# Patient Record
Sex: MALE | Race: WHITE | NOT HISPANIC OR LATINO | Employment: FULL TIME | URBAN - METROPOLITAN AREA
[De-identification: names, ages, dates, MRNs, and addresses within clinical notes are randomized per-mention and may not be internally consistent; named-entity substitution may affect disease eponyms.]

---

## 2021-10-28 ENCOUNTER — OFFICE VISIT (OUTPATIENT)
Dept: UROLOGY | Facility: CLINIC | Age: 61
End: 2021-10-28
Payer: COMMERCIAL

## 2021-10-28 VITALS — SYSTOLIC BLOOD PRESSURE: 157 MMHG | HEART RATE: 61 BPM | DIASTOLIC BLOOD PRESSURE: 79 MMHG

## 2021-10-28 DIAGNOSIS — R97.20 ELEVATED PSA: Primary | ICD-10-CM

## 2021-10-28 DIAGNOSIS — N40.1 BENIGN LOCALIZED PROSTATIC HYPERPLASIA WITH LOWER URINARY TRACT SYMPTOMS (LUTS): ICD-10-CM

## 2021-10-28 PROCEDURE — 99999 PR PBB SHADOW E&M-NEW PATIENT-LVL III: ICD-10-PCS | Mod: PBBFAC,,, | Performed by: UROLOGY

## 2021-10-28 PROCEDURE — 99999 PR PBB SHADOW E&M-NEW PATIENT-LVL III: CPT | Mod: PBBFAC,,, | Performed by: UROLOGY

## 2021-10-28 PROCEDURE — 99204 PR OFFICE/OUTPT VISIT, NEW, LEVL IV, 45-59 MIN: ICD-10-PCS | Mod: S$GLB,,, | Performed by: UROLOGY

## 2021-10-28 PROCEDURE — 99204 OFFICE O/P NEW MOD 45 MIN: CPT | Mod: S$GLB,,, | Performed by: UROLOGY

## 2021-10-28 RX ORDER — TAMSULOSIN HYDROCHLORIDE 0.4 MG/1
CAPSULE ORAL DAILY
COMMUNITY
End: 2022-05-20 | Stop reason: SDUPTHER

## 2021-10-28 RX ORDER — SIMVASTATIN 40 MG/1
40 TABLET, FILM COATED ORAL NIGHTLY
COMMUNITY

## 2021-12-17 ENCOUNTER — PATIENT MESSAGE (OUTPATIENT)
Dept: PHARMACY | Facility: CLINIC | Age: 61
End: 2021-12-17
Payer: COMMERCIAL

## 2022-01-09 ENCOUNTER — PATIENT MESSAGE (OUTPATIENT)
Dept: UROLOGY | Facility: CLINIC | Age: 62
End: 2022-01-09
Payer: COMMERCIAL

## 2022-01-13 ENCOUNTER — PATIENT MESSAGE (OUTPATIENT)
Dept: UROLOGY | Facility: CLINIC | Age: 62
End: 2022-01-13
Payer: COMMERCIAL

## 2022-01-14 ENCOUNTER — IMMUNIZATION (OUTPATIENT)
Dept: PHARMACY | Facility: CLINIC | Age: 62
End: 2022-01-14
Payer: COMMERCIAL

## 2022-01-14 ENCOUNTER — PATIENT MESSAGE (OUTPATIENT)
Dept: UROLOGY | Facility: CLINIC | Age: 62
End: 2022-01-14
Payer: COMMERCIAL

## 2022-01-14 DIAGNOSIS — Z23 NEED FOR VACCINATION: Primary | ICD-10-CM

## 2022-01-14 RX ORDER — SOLIFENACIN SUCCINATE 10 MG/1
10 TABLET, FILM COATED ORAL DAILY
Qty: 30 TABLET | Refills: 11 | Status: SHIPPED | OUTPATIENT
Start: 2022-01-14 | End: 2022-11-25

## 2022-04-11 ENCOUNTER — LAB VISIT (OUTPATIENT)
Dept: LAB | Facility: HOSPITAL | Age: 62
End: 2022-04-11
Attending: UROLOGY
Payer: COMMERCIAL

## 2022-04-11 DIAGNOSIS — R97.20 ELEVATED PSA: ICD-10-CM

## 2022-04-11 PROCEDURE — 84153 ASSAY OF PSA TOTAL: CPT | Performed by: UROLOGY

## 2022-04-11 PROCEDURE — 36415 COLL VENOUS BLD VENIPUNCTURE: CPT | Performed by: UROLOGY

## 2022-04-11 PROCEDURE — 86316 IMMUNOASSAY TUMOR OTHER: CPT | Performed by: UROLOGY

## 2022-04-12 ENCOUNTER — PATIENT MESSAGE (OUTPATIENT)
Dept: UROLOGY | Facility: CLINIC | Age: 62
End: 2022-04-12
Payer: COMMERCIAL

## 2022-04-12 LAB
-2 PROPSA (PHI): 13.6 PG/ML
FREE PSA (PHI): 1.3 NG/ML
PROSTATE HEALTH INDEX VALUE (PHI): 25.2
PSA FREE MFR SERPL: 22 %
PSA SERPL-MCNC: 5.8 NG/ML

## 2022-04-28 ENCOUNTER — OFFICE VISIT (OUTPATIENT)
Dept: UROLOGY | Facility: CLINIC | Age: 62
End: 2022-04-28
Payer: COMMERCIAL

## 2022-04-28 VITALS
DIASTOLIC BLOOD PRESSURE: 85 MMHG | HEIGHT: 70 IN | SYSTOLIC BLOOD PRESSURE: 141 MMHG | BODY MASS INDEX: 27.21 KG/M2 | WEIGHT: 190.06 LBS | HEART RATE: 58 BPM

## 2022-04-28 DIAGNOSIS — R97.20 ELEVATED PSA: ICD-10-CM

## 2022-04-28 PROCEDURE — 99213 OFFICE O/P EST LOW 20 MIN: CPT | Mod: S$GLB,,, | Performed by: UROLOGY

## 2022-04-28 PROCEDURE — 99999 PR PBB SHADOW E&M-EST. PATIENT-LVL III: CPT | Mod: PBBFAC,,, | Performed by: UROLOGY

## 2022-04-28 PROCEDURE — 99999 PR PBB SHADOW E&M-EST. PATIENT-LVL III: ICD-10-PCS | Mod: PBBFAC,,, | Performed by: UROLOGY

## 2022-04-28 PROCEDURE — 99213 PR OFFICE/OUTPT VISIT, EST, LEVL III, 20-29 MIN: ICD-10-PCS | Mod: S$GLB,,, | Performed by: UROLOGY

## 2022-04-28 NOTE — PROGRESS NOTES
Clinic Note  4/28/2022      Subjective:         Chief Complaint:   HPI  Jon Bowen is a 61 y.o. male with a history of elevated PSA. Recent MRI 9/30/2021 shows stable to improved PI-RADS 2 lesions. 65 ccs.  Here with wife Citlali. Friend of Messi Elizabeth.  Negative family history.  10/4/2021- 5.94  PSAD- 0.09  Biopsy 6/15/2016- benign   Biopsy (fusion) 5/11/2020- benign  Significant LUTS - nocturia, urgency, incomplete emptying, decreased FOS, frequency q 1hour. Frequency increased with acidic drinks and caffeine, managed with Vesicare and flomax by Dr. Herr.  Erectile dysfunction- Has not tired oral medications. He is satisfied with his symptoms.     (4/11/22): Free PSA 2.3, %Free PSA 22%, PHI 25.2, PSA 5.8.            No results found for: PSA, PSADIAG, PSATOTAL, PSAFREE, PSAFREEPCT    Past Medical History:   Diagnosis Date    Elevated PSA     Hypertension      Family History   Problem Relation Age of Onset    Hypertension Mother      Social History     Socioeconomic History    Marital status:    Tobacco Use    Smoking status: Never Smoker    Smokeless tobacco: Never Used   Substance and Sexual Activity    Alcohol use: Yes    Drug use: Never    Sexual activity: Yes     History reviewed. No pertinent surgical history.  Patient Active Problem List   Diagnosis    Elevated PSA     Review of Systems   Constitutional: Negative for appetite change, chills, fatigue, fever and unexpected weight change.   HENT: Negative for nosebleeds.    Respiratory: Negative for shortness of breath and wheezing.    Cardiovascular: Negative for chest pain, palpitations and leg swelling.   Gastrointestinal: Negative for abdominal distention, abdominal pain, constipation, diarrhea, nausea and vomiting.   Genitourinary: Positive for decreased urine volume, frequency, nocturia and urgency. Negative for dysuria.   Musculoskeletal: Negative for arthralgias and back pain.   Skin: Negative for pallor.  "  Neurological: Negative for dizziness, seizures and syncope.   Hematological: Negative for adenopathy.   Psychiatric/Behavioral: Negative for dysphoric mood.         Objective:      BP (!) 141/85   Pulse (!) 58   Ht 5' 10" (1.778 m)   Wt 86.2 kg (190 lb 0.6 oz)   BMI 27.27 kg/m²   Estimated body mass index is 27.27 kg/m² as calculated from the following:    Height as of this encounter: 5' 10" (1.778 m).    Weight as of this encounter: 86.2 kg (190 lb 0.6 oz).  Physical Exam  Constitutional:       General: He is not in acute distress.     Appearance: He is well-developed. He is not diaphoretic.   HENT:      Head: Atraumatic.   Neck:      Trachea: No tracheal deviation.   Cardiovascular:      Rate and Rhythm: Normal rate.   Pulmonary:      Effort: Pulmonary effort is normal. No respiratory distress.      Breath sounds: No wheezing.   Abdominal:      General: Bowel sounds are normal. There is no distension.      Palpations: Abdomen is soft. There is no mass.      Tenderness: There is no abdominal tenderness. There is no guarding or rebound.   Genitourinary:     Prostate: Enlarged.      Rectum: Normal. No mass, tenderness, external hemorrhoid or internal hemorrhoid.   Skin:     General: Skin is warm and dry.   Neurological:      Mental Status: He is alert and oriented to person, place, and time.   Psychiatric:         Behavior: Behavior normal.         Thought Content: Thought content normal.         Judgment: Judgment normal.           Assessment and Plan:           Problem List Items Addressed This Visit        Renal/    Elevated PSA          Follow up:   Will f/u with Dr. Herr for LUTS. Urodynamics.  6 months with PHI score. Discussed MRI and biopsy.    Daniel Liu          "

## 2022-05-16 ENCOUNTER — PATIENT MESSAGE (OUTPATIENT)
Dept: UROLOGY | Facility: CLINIC | Age: 62
End: 2022-05-16
Payer: COMMERCIAL

## 2022-05-20 RX ORDER — TAMSULOSIN HYDROCHLORIDE 0.4 MG/1
0.4 CAPSULE ORAL DAILY
Qty: 90 CAPSULE | Refills: 4 | Status: SHIPPED | OUTPATIENT
Start: 2022-05-20 | End: 2023-06-09

## 2022-08-07 ENCOUNTER — PATIENT MESSAGE (OUTPATIENT)
Dept: UROLOGY | Facility: CLINIC | Age: 62
End: 2022-08-07
Payer: COMMERCIAL

## 2022-08-18 ENCOUNTER — OFFICE VISIT (OUTPATIENT)
Dept: UROLOGY | Facility: CLINIC | Age: 62
End: 2022-08-18
Payer: COMMERCIAL

## 2022-08-18 VITALS
HEART RATE: 62 BPM | WEIGHT: 196 LBS | DIASTOLIC BLOOD PRESSURE: 78 MMHG | SYSTOLIC BLOOD PRESSURE: 149 MMHG | BODY MASS INDEX: 28.06 KG/M2 | HEIGHT: 70 IN

## 2022-08-18 DIAGNOSIS — N32.81 OVERACTIVE BLADDER: Primary | ICD-10-CM

## 2022-08-18 PROCEDURE — 99214 PR OFFICE/OUTPT VISIT, EST, LEVL IV, 30-39 MIN: ICD-10-PCS | Mod: S$GLB,,, | Performed by: UROLOGY

## 2022-08-18 PROCEDURE — 99999 PR PBB SHADOW E&M-EST. PATIENT-LVL III: ICD-10-PCS | Mod: PBBFAC,,, | Performed by: UROLOGY

## 2022-08-18 PROCEDURE — 99214 OFFICE O/P EST MOD 30 MIN: CPT | Mod: S$GLB,,, | Performed by: UROLOGY

## 2022-08-18 PROCEDURE — 99999 PR PBB SHADOW E&M-EST. PATIENT-LVL III: CPT | Mod: PBBFAC,,, | Performed by: UROLOGY

## 2022-08-18 RX ORDER — CIPROFLOXACIN 500 MG/1
500 TABLET ORAL 2 TIMES DAILY
COMMUNITY
Start: 2022-08-07 | End: 2022-11-25

## 2022-08-18 RX ORDER — SOD SULF/POT CHLORIDE/MAG SULF 1.479 G
12 TABLET ORAL EVERY 6 HOURS
COMMUNITY
Start: 2022-07-13

## 2022-08-18 NOTE — PROGRESS NOTES
[facility]      Overactive Bladder (OAB) Note    8/18/2022    Referred by:  No ref. provider found    HPI: Jon Bowen is a very pleasant 61 y.o. male who is an established patient in our department referred for evaluation of urinary frequency of 1-2 years duration. He is followed by Dr. Tafoya for elevated PSA; his recent note details current surveillance.  He reports bother is associated without urinary daytime frequency, with nocturia (4-6x per night) and with urgency that does not result in urinary incontinence. He reports no stress urinary incontinence associated with exertion. He does not require daily pad use.  He has not made changes to fluid intake.  He reports urinary incontinence is only at night. Patient reports urgency is independent of position.     He denies symptoms of irritative voiding including dysuria. He denies symptoms of obstructive voiding including decreased stream, hesitancy, intermittency, post void dribbling and sense of incomplete emptying. Bladder scan PVR was 296 mL. His history includes no notation of urolithiasis, hematuria, prior pelvic surgery, previous prolapse or incontinence procedures or neurological symptoms/diagnoses. He denies all other prior pelvic surgeries or neurologic diagnoses. Started on Flomax initially without improvement in symptoms.    Previous incontinence therapies:   Medication:   o oral oxybutynin ER (dry mouth was intolerable)  o VESIcare (some benefit but dry mouth and constipation)     A review of 10+ systems was conducted with pertinent positive and negative findings documented in HPI with all other systems reviewed and negative.    Past medical, family, surgical and social history reviewed as documented in chart with pertinent positive medical, family, surgical and social history detailed in HPI.    Exam Findings:    Const: no acute distress, conversant and alert  Eyes: anicteric, extraocular muscles intact  ENMT:  normocephalic, Nl oral membranes  Cardio: no cyanosis, nl cap refill  Pulm: no tachypnea; no resp distress  Musc: no laceration, no tenderness  Neuro: alert; oriented x 3  Skin: warm, dry; no petichiae  Psych: no anxiety; normal speech     Assessment/Plan:     Overactive Bladder with Urgency Incontinence (estab, worsening): His history is suggestive of Overactive Bladder (OAB) also have a suspicion for nocturnal polyuria. MRI showed a 65 cc prostate so he could also have significant SHIELDS. We will try substituting Myrbetriq for the VESIcare but given the PVR (higher today than previous), larger gland on MRI will plan on UDS. Will also review a volume-based 24 hour diary to evaluate for nocturnal polyuria.

## 2022-08-29 ENCOUNTER — TELEPHONE (OUTPATIENT)
Dept: UROLOGY | Facility: CLINIC | Age: 62
End: 2022-08-29
Payer: COMMERCIAL

## 2022-08-29 DIAGNOSIS — N32.81 OVERACTIVE BLADDER: Primary | ICD-10-CM

## 2022-10-24 ENCOUNTER — LAB VISIT (OUTPATIENT)
Dept: LAB | Facility: HOSPITAL | Age: 62
End: 2022-10-24
Attending: UROLOGY
Payer: COMMERCIAL

## 2022-10-24 DIAGNOSIS — R97.20 ELEVATED PSA: ICD-10-CM

## 2022-10-24 PROCEDURE — 84153 ASSAY OF PSA TOTAL: CPT | Performed by: UROLOGY

## 2022-10-24 PROCEDURE — 36415 COLL VENOUS BLD VENIPUNCTURE: CPT | Performed by: UROLOGY

## 2022-10-24 PROCEDURE — 84154 ASSAY OF PSA FREE: CPT | Performed by: UROLOGY

## 2022-10-25 LAB
-2 PROPSA (PHI): 14 PG/ML
FREE PSA (PHI): 1.3 NG/ML
PROSTATE HEALTH INDEX VALUE (PHI): 27.7
PSA FREE MFR SERPL: 20 %
PSA SERPL-MCNC: 6.6 NG/ML

## 2022-11-03 ENCOUNTER — OFFICE VISIT (OUTPATIENT)
Dept: UROLOGY | Facility: CLINIC | Age: 62
End: 2022-11-03
Payer: COMMERCIAL

## 2022-11-03 VITALS
BODY MASS INDEX: 28.47 KG/M2 | HEIGHT: 70 IN | SYSTOLIC BLOOD PRESSURE: 156 MMHG | HEART RATE: 71 BPM | WEIGHT: 198.88 LBS | DIASTOLIC BLOOD PRESSURE: 80 MMHG

## 2022-11-03 DIAGNOSIS — R97.20 ELEVATED PSA: Primary | ICD-10-CM

## 2022-11-03 DIAGNOSIS — N32.81 OVERACTIVE BLADDER: ICD-10-CM

## 2022-11-03 LAB
BILIRUB SERPL-MCNC: NORMAL MG/DL
BLOOD URINE, POC: NORMAL
CLARITY, POC UA: CLEAR
COLOR, POC UA: YELLOW
GLUCOSE UR QL STRIP: NORMAL
KETONES UR QL STRIP: NORMAL
LEUKOCYTE ESTERASE URINE, POC: NORMAL
NITRITE, POC UA: NORMAL
PH, POC UA: 5
PROTEIN, POC: NORMAL
SPECIFIC GRAVITY, POC UA: 1.02
UROBILINOGEN, POC UA: NORMAL

## 2022-11-03 PROCEDURE — 99214 PR OFFICE/OUTPT VISIT, EST, LEVL IV, 30-39 MIN: ICD-10-PCS | Mod: S$GLB,,,

## 2022-11-03 PROCEDURE — 99999 PR PBB SHADOW E&M-EST. PATIENT-LVL III: ICD-10-PCS | Mod: PBBFAC,,,

## 2022-11-03 PROCEDURE — 81002 POCT URINE DIPSTICK WITHOUT MICROSCOPE: ICD-10-PCS | Mod: S$GLB,,,

## 2022-11-03 PROCEDURE — 99999 PR PBB SHADOW E&M-EST. PATIENT-LVL III: CPT | Mod: PBBFAC,,,

## 2022-11-03 PROCEDURE — 99214 OFFICE O/P EST MOD 30 MIN: CPT | Mod: S$GLB,,,

## 2022-11-03 PROCEDURE — 81002 URINALYSIS NONAUTO W/O SCOPE: CPT | Mod: S$GLB,,,

## 2022-11-03 NOTE — PROGRESS NOTES
CHIEF COMPLAINT:  6 month PHI results     HISTORY OF PRESENTING ILLINESS:  Jon Bowen is a very pleasant 62 y.o. male who is an established patient in our department. Records in media tab. Recent MRI 9/30/2021 shows stable to improved PI-RADS 2 lesions. 65 ccs.  Negative family history.  10/4/2021- 5.94  PSAD- 0.09  Biopsy 6/15/2016- benign   Biopsy (fusion) 5/11/2020- benign  Significant LUTS - nocturia, urgency, incomplete emptying, decreased FOS, frequency q 1hour. Frequency increased with acidic drinks and caffeine, managed with Vesicare and flomax by Dr. Herr.  Erectile dysfunction- Has not tired oral medications. He is satisfied with his symptoms.      He is also seeing Dr. Herr for nocturia (4-6x per night) and urinary urgency that does not result in urinary incontinence. He reports no stress urinary incontinence associated with exertion. He does not require daily pad use.  He has not made changes to fluid intake.  He reports urinary incontinence is only at night. Patient reports urgency is independent of position. Urodynamics scheduled.        PHI:  (4/11/22): Free PSA 2.3, %Free PSA 22%, PHI 25.2, PSA 5.8.   (10/28/22): Free PSA 1.3, %free PSA 20%, PHI 27.7, PSA 6.6    REVIEW OF SYSTEMS:  Review of Systems   Constitutional:  Negative for chills and fever.   HENT:  Negative for congestion and sore throat.    Respiratory:  Negative for cough and shortness of breath.    Cardiovascular:  Negative for chest pain and palpitations.   Gastrointestinal:  Negative for nausea and vomiting.   Genitourinary:  Positive for frequency and urgency. Negative for dysuria, flank pain and hematuria.   Neurological:  Negative for dizziness and headaches.   All other systems reviewed and are negative.      PATIENT HISTORY:    Past Medical History:   Diagnosis Date    Elevated PSA     Hypertension        History reviewed. No pertinent surgical history.    Family History   Problem Relation Age of Onset     Hypertension Mother        Social History     Socioeconomic History    Marital status:    Tobacco Use    Smoking status: Never    Smokeless tobacco: Never   Substance and Sexual Activity    Alcohol use: Yes    Drug use: Never    Sexual activity: Yes       Allergies:  Patient has no known allergies.    Medications:    Current Outpatient Medications:     mirabegron (MYRBETRIQ) 50 mg Tb24, Take 1 tablet (50 mg total) by mouth once daily., Disp: 30 tablet, Rfl: 11    simvastatin (ZOCOR) 40 MG tablet, Take 40 mg by mouth every evening., Disp: , Rfl:     tamsulosin (FLOMAX) 0.4 mg Cap, Take 1 capsule (0.4 mg total) by mouth once daily., Disp: 90 capsule, Rfl: 4    ciprofloxacin HCl (CIPRO) 500 MG tablet, Take 500 mg by mouth 2 (two) times daily., Disp: , Rfl:     solifenacin (VESICARE) 10 MG tablet, Take 1 tablet (10 mg total) by mouth once daily., Disp: 30 tablet, Rfl: 11    SUTAB 1.479-0.188- 0.225 gram tablet, Take 12 tablets by mouth every 6 (six) hours., Disp: , Rfl:     PHYSICAL EXAMINATION:  Physical Exam  Constitutional:       Appearance: Normal appearance.   HENT:      Head: Normocephalic and atraumatic.      Right Ear: External ear normal.      Left Ear: External ear normal.   Pulmonary:      Effort: Pulmonary effort is normal. No respiratory distress.   Skin:     General: Skin is warm and dry.   Neurological:      General: No focal deficit present.      Mental Status: He is alert and oriented to person, place, and time.   Psychiatric:         Mood and Affect: Mood normal.         Behavior: Behavior normal.         LABS:  UA: yellow, sg 1.020, ph 5, trace protein      IMPRESSION:  Encounter Diagnoses   Name Primary?    Elevated PSA Yes    Overactive bladder          Assessment:       1. Elevated PSA    2. Overactive bladder        Plan:   - PSA in 1 month due to elevated PSA from most recent PHI, if 6.6 or higher - will schedule pt for MRI prostate - will call pt with results    Follow up dependent on  repeat PSA    I spent 30 minutes with the patient of which more than half was spent in direct consultation with the patient in regards to our treatment and plan.  We addressed the office findings and recent labs.   Education and recommendations of today's plan of care including home remedies and needed follow up with PCP.   We discussed the chief complaint/LUTS and the possible contributory factors.   Recommended lifestyle modifications with proper, healthy diet, good hydration if no fluid restrictions; reducing bladder irritants.   Benefits of regular exercise approved by PCP.

## 2022-11-04 ENCOUNTER — TELEPHONE (OUTPATIENT)
Dept: UROLOGY | Facility: CLINIC | Age: 62
End: 2022-11-04
Payer: COMMERCIAL

## 2022-11-04 ENCOUNTER — PATIENT MESSAGE (OUTPATIENT)
Dept: UROLOGY | Facility: CLINIC | Age: 62
End: 2022-11-04
Payer: COMMERCIAL

## 2022-11-04 NOTE — TELEPHONE ENCOUNTER
Called patient to confirm arrival time of 7am for FUDs procedure on 11/7/2022.  Gave location and explained to pt that there is no fasting for this procedure. Patient verbalized understanding.

## 2022-11-07 ENCOUNTER — HOSPITAL ENCOUNTER (OUTPATIENT)
Facility: HOSPITAL | Age: 62
Discharge: HOME | End: 2022-11-07
Attending: UROLOGY | Admitting: UROLOGY
Payer: COMMERCIAL

## 2022-11-07 VITALS
WEIGHT: 195 LBS | RESPIRATION RATE: 16 BRPM | BODY MASS INDEX: 27.98 KG/M2 | SYSTOLIC BLOOD PRESSURE: 168 MMHG | TEMPERATURE: 99 F | OXYGEN SATURATION: 98 % | HEART RATE: 68 BPM | DIASTOLIC BLOOD PRESSURE: 88 MMHG

## 2022-11-07 DIAGNOSIS — N32.81 OVERACTIVE BLADDER: ICD-10-CM

## 2022-11-07 PROCEDURE — 51728 PR COMPLEX CYSTOMETROGRAM VOIDING PRESSURE STUDIES: ICD-10-PCS | Mod: 26,,, | Performed by: UROLOGY

## 2022-11-07 PROCEDURE — 51600 PR INJECTION FOR BLADDER X-RAY: ICD-10-PCS | Mod: 51,,, | Performed by: UROLOGY

## 2022-11-07 PROCEDURE — 36000707: Performed by: UROLOGY

## 2022-11-07 PROCEDURE — 51600 INJECTION FOR BLADDER X-RAY: CPT | Mod: 51,,, | Performed by: UROLOGY

## 2022-11-07 PROCEDURE — 51741 PR UROFLOWMETRY, COMPLEX: ICD-10-PCS | Mod: 26,51,, | Performed by: UROLOGY

## 2022-11-07 PROCEDURE — 52000 CYSTOURETHROSCOPY: CPT | Mod: 59,,, | Performed by: UROLOGY

## 2022-11-07 PROCEDURE — 51797 PR VOIDING PRESS STUDY INTRA-ABDOMINAL VOID: ICD-10-PCS | Mod: 26,,, | Performed by: UROLOGY

## 2022-11-07 PROCEDURE — 51784 PR ANAL/URINARY MUSCLE STUDY: ICD-10-PCS | Mod: 26,51,, | Performed by: UROLOGY

## 2022-11-07 PROCEDURE — 36000706: Performed by: UROLOGY

## 2022-11-07 PROCEDURE — 51784 ANAL/URINARY MUSCLE STUDY: CPT | Mod: 26,51,, | Performed by: UROLOGY

## 2022-11-07 PROCEDURE — 27200971 HC CYSTO SUPPLY II (SCOPE PRCDR.): Performed by: UROLOGY

## 2022-11-07 PROCEDURE — 27200973 HC CYSTO SUPPLY IV (URODYNAMICS): Performed by: UROLOGY

## 2022-11-07 PROCEDURE — 74455 X-RAY URETHRA/BLADDER: CPT | Mod: 26,,, | Performed by: UROLOGY

## 2022-11-07 PROCEDURE — 74455 PR X-RAY URETHROCYSTOGRAM+VOIDING: ICD-10-PCS | Mod: 26,,, | Performed by: UROLOGY

## 2022-11-07 PROCEDURE — 51797 INTRAABDOMINAL PRESSURE TEST: CPT | Mod: 26,,, | Performed by: UROLOGY

## 2022-11-07 PROCEDURE — 52000 PR CYSTOURETHROSCOPY: ICD-10-PCS | Mod: 59,,, | Performed by: UROLOGY

## 2022-11-07 PROCEDURE — 51741 ELECTRO-UROFLOWMETRY FIRST: CPT | Mod: 26,51,, | Performed by: UROLOGY

## 2022-11-07 PROCEDURE — 51728 CYSTOMETROGRAM W/VP: CPT | Mod: 26,,, | Performed by: UROLOGY

## 2022-11-07 NOTE — OP NOTE
Urodynamic Report    [facility]       Referring Physician:  Otoniel Herr MD    YOB: 1960  Date of Exam: 11/7/2022    HPI: His history is suggestive of Overactive Bladder (OAB) also have a suspicion for nocturnal polyuria. MRI showed a 65 cc prostate so he could also have significant SHIELDS. We will try substituting Myrbetriq for the VESIcare but given the PVR (higher today than previous), larger gland on MRI will plan on UDS. Will also review a volume-based 24 hour diary to evaluate for nocturnal polyuria.      Cystometrogram:    Position: Sitting  Filling Rate: 30 mL/sec   Catheter: 7F  Fluid:Conray       Cath PVR prior: 50 mL Voiding Diary Capacity: Not Available   First Sensation: 25 mL First Desire: 36 mL   Strong Desire: 86 mL Cystometric Capacity: 192 mL       Pdet at group home: 0 cm H2O Compliance: Normal       Detrusor Overactivity (DO): Present Volume first DO:  67 mL   Max DO Pressure:  19 cmH2O Urgency Incontinence: Absent        Leak Point Pressure Testing:        Volume Tested: 100 mL  Abdominal Leak Point Pressure: No Leak   Stress Induced DO: Absent          Voiding Study:        Voided Volume: 142 mL PVR: 50 mL   Maximum Flow: 6 mL/sec Average Flow 3-4 mL/sec   Max Pdet: [id number]  Pdet at Max Flow: 126 cmH2O   EMG Storage: Normal Recruitment  EMG Voiding: Normal quiescence       Fluroscopic Imaging:        Bladder Contour: Mild trabeculation Vesicoureteral Reflux:No VUR   Bladder Neck at Rest: closed Bladder Neck Voiding:Narrowed - Fixed       Impression:        Sensation:Early    Capacity: Small    Compliance:Normal    Detrusor Overactivity:Present - No Leak    Continence: No Incontinence    Contractility: Bladder Outlet Obstruction    Emptying:Satisfactory    Coordination:Coordinated Voiding          Summary:  This study was performed in accordance with the current AUA/SUFU Guideline on Adult Urodynamics. On filling phase he demonstrates early first and strong  desire with a small bladder capacity. There is detrusor overactivity (DO) demonstrated on this exam (though absence of DO on urodynamic evaluation does not exclude it as causative agent of urgency symptoms). Bladder compliance at capacity is normal. On fluoroscopy, the bladder contour is smooth. There is no VUR demonstrated on this exam.     On stress testing, with adequate cough and valsalva effort, there is no ANATOLIY demonstrated and patient reports no clinical history of ANATOLIY.    On voiding phase,  voiding pressures are clearly indicative of SHIELDS (with BOOI  =114) . On VCUG obstruction appeared to be at bladder neck, confirmed on cystoscopy due to prostatic obstruction.     Cystoscopy Details: Informed consent was obtained and he was sterily prepped and 1% lidocaine jelly was injected per urethra. A flexible cystoscope was inserted into the bladder via the urethra. There was no evidence of stricture, stenosis, lesions, or diverticulum. Significant findings included bilateral lobar prostatic hypertrophy suggestive of obstruction. No prominent median lobe. Cystoscopic examination of the bladder revealed orthotopically positioned, normal bilateral ureteral orifices with clear yellow urine effluxing from each orifice. All mucosal surfaces were examined with no apparent stones, tumors, foreign bodies, erythema, trabeculation, diverticula, or ulcers. The procedure was concluded without complications. The patient was not administered a post-procedure antibiotic.     Assessment/Plan:    He clearly demonstrates bladder outlet obstruction at the level of the bladder neck and prostatic urethra, confirmed on cystoscopy. I would recommend that we treat this prior to any additional measures to treat OAB, such as SNM or Botox, though he may have both SHIELDS and OAB.     However, he has a history of elevated PSA, though two negative biopsies in the past, most recently a fusion biopsy 1-2 years ago. Most recent PSA increase from 5.8 to  6.6. His evaluation is summarized in Dr. Tafoya's note from 6 months ago.     I am recommending that he meet with urologic oncology to discuss the merits of repeat MRI or biopsy prior to to proceeding with outlet reducing procedure. Should he undergo TURP/PVP, were he to have a subsequent positive biopsy, his treatment options would be limited by his risk of incontinence. If it is felt that his likelihood of prostate cancer is low enough, I would recommend outlet-reducing procedure.

## 2022-11-18 NOTE — PROGRESS NOTES
[facility]  Urologic Oncology Clinic Note        Date of Service: 11/22/2022      Chief Complaint/Reason for Consultation: Elevated PSA    Requesting Provider:   No referring provider defined for this encounter.      History of Present Illness:   Patient 62 y.o. male presents with hx of two negative prostate biopsies and slightly rising PSA value. Here to discuss role of repeat biopsy. He was recently seen for OAB and it was determined he might be a candidate for minimally invasive BPH surgery. Before he undergoes BPH surgery there was a concern that he may need prostate biopsy. At present, he is off myrbetriq and his LUTs are stable if not improved without intervention. He is considering avoiding BPH surgery at this time.     Urologic History:     6/15/2016 -- TRUSBx -- benign  5/11/2020 -- TRUSBx -- benign  10/4/2021  -- PSA 5.94  12/6/2021 -- mpMRI -- PV 55.4, PSAD 0.09 ; GILLIAN 1 PIRADS 3 right mid transition zone  10/24/2022 -- PSA 6.6, % free PSA 20, PHI 27.7     Patient Active Problem List    Diagnosis Date Noted    Elevated PSA 04/28/2022          Review of patient's allergies indicates:  No Known Allergies     Past Medical History:   Diagnosis Date    Elevated PSA     Hypertension       Past Surgical History:   Procedure Laterality Date    CYSTOSCOPY WITH URODYNAMIC TESTING N/A 11/7/2022    Procedure: CYSTOSCOPY, WITH URODYNAMIC TESTING;  Surgeon: Otoniel Herr MD;  Location: [facility] OR [address] [place] [address];  Service: Urology;  Laterality: N/A;  1hr      Family History   Problem Relation Age of Onset    Hypertension Mother       Social History     Tobacco Use    Smoking status: Never    Smokeless tobacco: Never   Substance Use Topics    Alcohol use: Yes        Review of Systems   Constitutional:  Negative for activity change and fatigue.   HENT:  Negative for congestion.    Respiratory:  Negative for cough.    Cardiovascular:  Negative for chest pain.   Gastrointestinal:  Negative for abdominal distention and  "abdominal pain.   Genitourinary:  Negative for difficulty urinating, dysuria, frequency and hematuria.           OBJECTIVE:       Vitals:    11/22/22 1531   BP: (!) 157/84   Pulse: 63   Weight: 91.7 kg (202 lb 2.6 oz)   Height: 5' 10" (1.778 m)           General Appearance: Alert, cooperative, no distress  Head: Normocephalic  Eyes: Clear conjunctiva  Neck: No obvious LND or JVD  Lungs: Normal chest excursion, no accessory muscle use  Chest: Regular rate rhythm by palpation, no pedal edema  Extremities: Atraumatic   Lymph Nodes: No appreciable lymph adenopathy  Neurologic: No gross gait, motor or sensory deficits            LAB:      4/11/2022 -- PSA 5.8, %free PSA 22, PHI 25.2   10/24/2022 -- PSA 6.6      IMAGING:      [facility] MRI INTERPRETATION OF OUTSIDE FILMS     CLINICAL HISTORY:  Additional history: None provided.     TECHNIQUE:  Multiparametric MRI of the prostate/pelvis performed on a 3T scanner with phase pelvic coil. Multiplanar, multisequence images including high resolution, small field-of-view T2-WI; axial diffusion weighted images with multiple B-values and creation of ADC-maps; and dynamic contrast enhanced T1-weighted images through the prostate were obtained before, during, and after the administration of 10 cc intravenous gadolinium.     COMPARISON:  None.     FINDINGS:  Previous biopsy: No adenocarcinoma identified on 05/08/2022 delete     PSA: 6.6 ng/mL (date)     Prior therapy: None     Prostate: 5.2 x 3.7 x 5.7 cm corresponding to a computed volume of 55.4 cc.     Peripheral zone: Diffuse mild T2 hypointensity, score 2.     Transitional zone:     Lesion (GILLIAN) #T-1     Location: Side:Right; Region: Mid; Zone: Posterior     Greatest dimension: 1.4 cm     T2-WI: Heterogeneous signal intensity withobscured margins, score 3.     DWI/ADC: Focal (discrete and different from the background) hypointense on ADC and/or focal hyperintense on high b-value DWI; may be markedly hypointense on ADC or markedly " hyperintense on high b-value DWI, but not both, score 3.     DCE: Negative     Extraprostatic extension: Negative     PI-RADS assessment category: 3     Neurovascular bundle: Normal appearance.     Seminal vesicles: Normal appearance.     Adjacent Organ Involvement: No evidence for urinary bladder or rectal invasion.     Lymphadenopathy: None.     Other Findings: None.     Impression:     1. Focal lesion within the right mid transition zone with intermediate likelihood of neoplasm.  PI-RADS 3.  Overall Assessment: PI-RADS 3 - Intermediate (the presence of clinically significant cancer is equivocal)     Number of targets created for potential MR/US fusion biopsy     Peripheral zone: 0     Transition zone: 1     Electronically signed by resident: Justin Hicks  Date:                                            11/23/2022  Time:                                           11:06     Electronically signed by: Juan Miguel Darling MD  Date:                                            11/23/2022  Time:                                           11:40          ASSESSMENT/PLAN:     61 yo M w hx of elevated PSA, equivocal MRI, stable PHI and %free PSA.     Plan:    Today I counseled the patient that undergoing any form of BPH surgery could make incontinence worse after radical prostatectomy if he ever needed definitive treatment for prostate cancer.  However, at this time I do not believe that he needs to undergo repeat prostate biopsy as he is had 2 negative prostate biopsies in the past with stable % free PSA and PHI  scores with only mild elevation in his most recent PSA from baseline values.  I informed him that there is decent data supporting the fact that low PHI score with negative MRI portend lack of diagnostic biopsy showing clinically significant cancer and as a result at this time I believe he is at low risk for diagnosis of clinically significant prostate cancer by biopsy that would warrant treatment with surgery.   With respect to his lower urinary tract symptoms I told the patient that he may consider avoiding BPH surgery at this time since his symptoms have resolved off of medical treatment.  The patient will continue his follow-up care with Dr. Tafoya in the future for monitoring of his PSA.    The total time for the established patient visit was at least 30 minutes in both face-to-face and non-face-to-face activities, which included chart review, interpretation of results, counseling, education, ordering meds/tests/procedures, and/or coordination of care.        Waqas Armenta MD  Urologic Oncology  P: [phone]

## 2022-11-22 ENCOUNTER — OFFICE VISIT (OUTPATIENT)
Dept: UROLOGY | Facility: CLINIC | Age: 62
End: 2022-11-22
Payer: COMMERCIAL

## 2022-11-22 VITALS
HEART RATE: 63 BPM | SYSTOLIC BLOOD PRESSURE: 157 MMHG | WEIGHT: 202.19 LBS | BODY MASS INDEX: 28.95 KG/M2 | HEIGHT: 70 IN | DIASTOLIC BLOOD PRESSURE: 84 MMHG

## 2022-11-22 DIAGNOSIS — R97.20 ELEVATED PSA: Primary | ICD-10-CM

## 2022-11-22 DIAGNOSIS — N40.0 BENIGN PROSTATIC HYPERPLASIA, UNSPECIFIED WHETHER LOWER URINARY TRACT SYMPTOMS PRESENT: ICD-10-CM

## 2022-11-22 PROCEDURE — 99214 OFFICE O/P EST MOD 30 MIN: CPT | Mod: S$GLB,,, | Performed by: STUDENT IN AN ORGANIZED HEALTH CARE EDUCATION/TRAINING PROGRAM

## 2022-11-22 PROCEDURE — 99999 PR PBB SHADOW E&M-EST. PATIENT-LVL III: ICD-10-PCS | Mod: PBBFAC,,, | Performed by: STUDENT IN AN ORGANIZED HEALTH CARE EDUCATION/TRAINING PROGRAM

## 2022-11-22 PROCEDURE — 99214 PR OFFICE/OUTPT VISIT, EST, LEVL IV, 30-39 MIN: ICD-10-PCS | Mod: S$GLB,,, | Performed by: STUDENT IN AN ORGANIZED HEALTH CARE EDUCATION/TRAINING PROGRAM

## 2022-11-22 PROCEDURE — 99999 PR PBB SHADOW E&M-EST. PATIENT-LVL III: CPT | Mod: PBBFAC,,, | Performed by: STUDENT IN AN ORGANIZED HEALTH CARE EDUCATION/TRAINING PROGRAM

## 2022-11-23 ENCOUNTER — HOSPITAL ENCOUNTER (OUTPATIENT)
Dept: RADIOLOGY | Facility: HOSPITAL | Age: 62
Discharge: HOME | End: 2022-11-23
Attending: STUDENT IN AN ORGANIZED HEALTH CARE EDUCATION/TRAINING PROGRAM
Payer: COMMERCIAL

## 2022-11-23 PROCEDURE — 72197 CHG MRI, PELVIS, COMBO: ICD-10-PCS | Mod: 26,,, | Performed by: RADIOLOGY

## 2022-11-23 PROCEDURE — 72197 MRI PELVIS W/O & W/DYE: CPT | Mod: 26,,, | Performed by: RADIOLOGY

## 2023-04-10 ENCOUNTER — LAB VISIT (OUTPATIENT)
Dept: LAB | Facility: HOSPITAL | Age: 63
End: 2023-04-10
Attending: STUDENT IN AN ORGANIZED HEALTH CARE EDUCATION/TRAINING PROGRAM
Payer: COMMERCIAL

## 2023-04-10 DIAGNOSIS — R97.20 ELEVATED PSA: ICD-10-CM

## 2023-04-10 PROCEDURE — 36415 COLL VENOUS BLD VENIPUNCTURE: CPT | Performed by: STUDENT IN AN ORGANIZED HEALTH CARE EDUCATION/TRAINING PROGRAM

## 2023-04-10 PROCEDURE — 86316 IMMUNOASSAY TUMOR OTHER: CPT | Performed by: STUDENT IN AN ORGANIZED HEALTH CARE EDUCATION/TRAINING PROGRAM

## 2023-04-10 PROCEDURE — 84153 ASSAY OF PSA TOTAL: CPT | Performed by: STUDENT IN AN ORGANIZED HEALTH CARE EDUCATION/TRAINING PROGRAM

## 2023-04-11 LAB
-2 PROPSA (PHI): 20.8 PG/ML
FREE PSA (PHI): 1.5 NG/ML
PROSTATE HEALTH INDEX VALUE (PHI): 36.4
PSA FREE MFR SERPL: 22 %
PSA SERPL-MCNC: 6.9 NG/ML

## 2023-04-19 NOTE — PROGRESS NOTES
Clinic Note  4/19/2023      Subjective:         Chief Complaint:   HPI  Jon Bowen is a 62 y.o. male with a history of elevated PSA. Recent MRI 9/30/2021 shows stable to improved PI-RADS 2 lesions. 65 ccs.  Here with wife Citlali. Friend of Messi Elizabeth.  Negative family history.  10/4/2021- 5.94  PSAD- 0.09  Biopsy 6/15/2016- benign   Biopsy (fusion) 5/11/2020- benign  Significant LUTS - nocturia, urgency, incomplete emptying, decreased FOS, frequency q 1hour. Frequency increased with acidic drinks and caffeine, managed with Vesicare and flomax by Dr. Herr.  Erectile dysfunction- Has not tired oral medications. He is satisfied with his symptoms.      (4/11/22): Free PSA 2.3, %Free PSA 22%, PHI 25.2, PSA 5.8.        4/20/2023- PHI 4/10/2023- PSA 6.9, 22% free, PHI score 36.4.  Discussed data would suggest 10 to 33% risk of positive biopsy, 4% risk of high grade disease.      No results found for: PSA, PSADIAG, PSATOTAL, PSAFREE, PSAFREEPCT   Past Medical History:   Diagnosis Date    Elevated PSA     Hypertension      Family History   Problem Relation Age of Onset    Hypertension Mother      Social History     Socioeconomic History    Marital status:    Tobacco Use    Smoking status: Never    Smokeless tobacco: Never   Substance and Sexual Activity    Alcohol use: Yes    Drug use: Never    Sexual activity: Yes   Social History Narrative    ** Merged History Encounter **          Past Surgical History:   Procedure Laterality Date    CYSTOSCOPY WITH URODYNAMIC TESTING N/A 11/7/2022    Procedure: CYSTOSCOPY, WITH URODYNAMIC TESTING;  Surgeon: Otoniel Herr MD;  Location: [facility] OR [address] [place] [address];  Service: Urology;  Laterality: N/A;  1hr     Patient Active Problem List   Diagnosis    Elevated PSA     Review of Systems   Constitutional:  Negative for appetite change, chills, fatigue, fever and unexpected weight change.   HENT:  Negative for nosebleeds.    Respiratory:  Negative for shortness of  "breath and wheezing.    Cardiovascular:  Negative for chest pain, palpitations and leg swelling.   Gastrointestinal:  Negative for abdominal distention, abdominal pain, constipation, diarrhea, nausea and vomiting.   Genitourinary:  Positive for nocturia. Negative for dysuria and hematuria.   Musculoskeletal:  Negative for arthralgias and back pain.   Skin:  Negative for pallor.   Neurological:  Negative for dizziness, seizures and syncope.   Hematological:  Negative for adenopathy.   Psychiatric/Behavioral:  Negative for dysphoric mood.        Objective:      There were no vitals taken for this visit.  Estimated body mass index is 29.01 kg/m² as calculated from the following:    Height as of 11/22/22: 5' 10" (1.778 m).    Weight as of 11/22/22: 91.7 kg (202 lb 2.6 oz).  Physical Exam  Constitutional:       General: He is not in acute distress.     Appearance: He is well-developed. He is not diaphoretic.   HENT:      Head: Atraumatic.   Neck:      Trachea: No tracheal deviation.   Cardiovascular:      Rate and Rhythm: Normal rate.   Pulmonary:      Effort: Pulmonary effort is normal. No respiratory distress.      Breath sounds: No wheezing.   Abdominal:      General: Bowel sounds are normal. There is no distension.      Palpations: Abdomen is soft. There is no mass.      Tenderness: There is no abdominal tenderness. There is no guarding or rebound.   Genitourinary:     Prostate: Enlarged.      Rectum: Normal. No mass, tenderness, external hemorrhoid or internal hemorrhoid.   Skin:     General: Skin is warm and dry.   Neurological:      Mental Status: He is alert and oriented to person, place, and time.   Psychiatric:         Behavior: Behavior normal.         Thought Content: Thought content normal.         Judgment: Judgment normal.         Assessment and Plan:           Problem List Items Addressed This Visit       Elevated PSA - Primary       Follow up:   1 year with JAVIER Tafoya"

## 2023-04-20 ENCOUNTER — OFFICE VISIT (OUTPATIENT)
Dept: UROLOGY | Facility: CLINIC | Age: 63
End: 2023-04-20
Payer: COMMERCIAL

## 2023-04-20 VITALS
HEIGHT: 70 IN | WEIGHT: 206.69 LBS | SYSTOLIC BLOOD PRESSURE: 159 MMHG | BODY MASS INDEX: 29.59 KG/M2 | HEART RATE: 63 BPM | DIASTOLIC BLOOD PRESSURE: 79 MMHG

## 2023-04-20 DIAGNOSIS — R97.20 ELEVATED PSA: Primary | ICD-10-CM

## 2023-04-20 DIAGNOSIS — N40.1 BENIGN PROSTATIC HYPERPLASIA WITH NOCTURIA: ICD-10-CM

## 2023-04-20 DIAGNOSIS — R35.1 BENIGN PROSTATIC HYPERPLASIA WITH NOCTURIA: ICD-10-CM

## 2023-04-20 PROCEDURE — 99999 PR PBB SHADOW E&M-EST. PATIENT-LVL III: CPT | Mod: PBBFAC,,, | Performed by: UROLOGY

## 2023-04-20 PROCEDURE — 99999 PR PBB SHADOW E&M-EST. PATIENT-LVL III: ICD-10-PCS | Mod: PBBFAC,,, | Performed by: UROLOGY

## 2023-04-20 PROCEDURE — 99214 PR OFFICE/OUTPT VISIT, EST, LEVL IV, 30-39 MIN: ICD-10-PCS | Mod: S$GLB,,, | Performed by: UROLOGY

## 2023-04-20 PROCEDURE — 99214 OFFICE O/P EST MOD 30 MIN: CPT | Mod: S$GLB,,, | Performed by: UROLOGY

## 2023-06-09 RX ORDER — TAMSULOSIN HYDROCHLORIDE 0.4 MG/1
CAPSULE ORAL
Qty: 90 CAPSULE | Refills: 4 | Status: SHIPPED | OUTPATIENT
Start: 2023-06-09

## 2023-10-27 ENCOUNTER — IMMUNIZATION (OUTPATIENT)
Dept: INTERNAL MEDICINE | Facility: CLINIC | Age: 63
End: 2023-10-27
Payer: COMMERCIAL

## 2023-10-27 PROCEDURE — 90471 FLU VACCINE (QUAD) GREATER THAN OR EQUAL TO 3YO PRESERVATIVE FREE IM: ICD-10-PCS | Mod: S$GLB,,, | Performed by: INTERNAL MEDICINE

## 2023-10-27 PROCEDURE — 90686 FLU VACCINE (QUAD) GREATER THAN OR EQUAL TO 3YO PRESERVATIVE FREE IM: ICD-10-PCS | Mod: S$GLB,,, | Performed by: INTERNAL MEDICINE

## 2023-10-27 PROCEDURE — 90471 IMMUNIZATION ADMIN: CPT | Mod: S$GLB,,, | Performed by: INTERNAL MEDICINE

## 2023-10-27 PROCEDURE — 90686 IIV4 VACC NO PRSV 0.5 ML IM: CPT | Mod: S$GLB,,, | Performed by: INTERNAL MEDICINE

## 2024-04-15 ENCOUNTER — LAB VISIT (OUTPATIENT)
Dept: LAB | Facility: HOSPITAL | Age: 64
End: 2024-04-15
Attending: UROLOGY
Payer: COMMERCIAL

## 2024-04-15 DIAGNOSIS — R97.20 ELEVATED PSA: ICD-10-CM

## 2024-04-15 PROCEDURE — 86316 IMMUNOASSAY TUMOR OTHER: CPT | Performed by: UROLOGY

## 2024-04-15 PROCEDURE — 84153 ASSAY OF PSA TOTAL: CPT | Performed by: UROLOGY

## 2024-04-15 PROCEDURE — 36415 COLL VENOUS BLD VENIPUNCTURE: CPT | Performed by: UROLOGY

## 2024-04-17 LAB
-2 PROPSA (PHI): 15.7 PG/ML
FREE PSA (PHI): 1.5 NG/ML
PROSTATE HEALTH INDEX VALUE (PHI): 27.7
PSA FREE MFR SERPL: 21 %
PSA SERPL-MCNC: 7 NG/ML

## 2024-04-17 NOTE — PROGRESS NOTES
"Clinic Note  4/17/2024      Subjective:         Chief Complaint:   HPI  Jon Bowen is a 63 y.o. male with a history of elevated PSA. Recent MRI 9/30/2021 shows stable to improved PI-RADS 2 lesions. 66 ccs.  Here with wife Citlali. Friend of Messi Elizabeth.  Negative family history.  10/4/2021- 5.94  PSAD- 0.09  Biopsy 6/15/2016- benign   Biopsy (fusion) 5/11/2020- benign  Significant LUTS - nocturia, urgency, incomplete emptying, decreased FOS, frequency q 1hour. Frequency increased with acidic drinks and caffeine, managed with Vesicare and flomax by Dr. Herr.  Erectile dysfunction- Has not tired oral medications. He is satisfied with his symptoms.      (4/11/22): Free PSA 2.3, %Free PSA 22%, PHI 25.2, PSA 5.8.        4/20/2023- PHI 4/10/2023- PSA 6.9, 22% free, PHI score 36.4.  Discussed data would suggest 10 to 33% risk of positive biopsy, 4% risk of high grade disease.     4/18/2024- PHI- PSA 7.0, 21% free, PHI score-27.7.  On Flomax for LUTS.  PCPT-      Is voiding well on flomax. Some complaints of fatigue, ED, and low libido. States he has low testosterone checked by his prior urologist somewhere in the 200s. Will recheck with a morning T. Is not interested in a PDE5i right now.  No results found for: "PSA", "PSADIAG", "PSATOTAL", "PSAFREE", "PSAFREEPCT"   Past Medical History:   Diagnosis Date    Elevated PSA     Hypertension      Family History   Problem Relation Name Age of Onset    Hypertension Mother       Social History     Socioeconomic History    Marital status:    Tobacco Use    Smoking status: Never    Smokeless tobacco: Never   Substance and Sexual Activity    Alcohol use: Yes    Drug use: Never    Sexual activity: Yes   Social History Narrative    ** Merged History Encounter **          Past Surgical History:   Procedure Laterality Date    CYSTOSCOPY WITH URODYNAMIC TESTING N/A 11/7/2022    Procedure: CYSTOSCOPY, WITH URODYNAMIC TESTING;  Surgeon: Otoniel Herr MD;  " "[facility];  Service: Urology;  Laterality: N/A;  1hr     Patient Active Problem List   Diagnosis    Elevated PSA    Benign prostatic hyperplasia with nocturia     Review of Systems   Constitutional:  Positive for fatigue and unexpected weight change. Negative for activity change, appetite change, diaphoresis and fever.   HENT: Negative.     Eyes: Negative.    Respiratory:  Negative for cough, shortness of breath and wheezing.    Cardiovascular:  Negative for chest pain, palpitations and leg swelling.   Gastrointestinal:  Negative for abdominal pain, blood in stool, constipation, diarrhea, nausea and vomiting.   Genitourinary:  Negative for nocturia.   Musculoskeletal:  Negative for back pain.   Skin:  Negative for color change, rash and wound.   Neurological:  Negative for dizziness, weakness, numbness and headaches.   Hematological:  Does not bruise/bleed easily.   Psychiatric/Behavioral: Negative.           Objective:      There were no vitals taken for this visit.  Estimated body mass index is 29.66 kg/m² as calculated from the following:    Height as of 4/20/23: 5' 10" (1.778 m).    Weight as of 4/20/23: 93.8 kg (206 lb 10.9 oz).  Physical Exam  Vitals and nursing note reviewed.   Constitutional:       General: He is not in acute distress.     Appearance: He is well-developed.   Cardiovascular:      Rate and Rhythm: Normal rate.   Pulmonary:      Effort: Pulmonary effort is normal. No respiratory distress.   Abdominal:      General: There is no distension.      Palpations: Abdomen is soft.      Tenderness: There is no abdominal tenderness.   Musculoskeletal:         General: No tenderness. Normal range of motion.   Skin:     General: Skin is warm and dry.      Findings: No rash.   Neurological:      Mental Status: He is alert and oriented to person, place, and time.   Psychiatric:         Judgment: Judgment normal.           Assessment and Plan:           Problem List Items Addressed This Visit  "      Elevated PSA - Primary    Benign prostatic hyperplasia with nocturia       Follow up:   Will check T and have him see JUDE for this  Phi in 1 year    Juan Miguel Tafoya

## 2024-04-18 ENCOUNTER — OFFICE VISIT (OUTPATIENT)
Dept: UROLOGY | Facility: CLINIC | Age: 64
End: 2024-04-18
Payer: COMMERCIAL

## 2024-04-18 VITALS
DIASTOLIC BLOOD PRESSURE: 79 MMHG | SYSTOLIC BLOOD PRESSURE: 171 MMHG | WEIGHT: 204.81 LBS | HEIGHT: 70 IN | BODY MASS INDEX: 29.32 KG/M2 | HEART RATE: 66 BPM

## 2024-04-18 DIAGNOSIS — R97.20 ELEVATED PSA: Primary | ICD-10-CM

## 2024-04-18 DIAGNOSIS — N40.1 BENIGN PROSTATIC HYPERPLASIA WITH NOCTURIA: ICD-10-CM

## 2024-04-18 DIAGNOSIS — N52.01 ERECTILE DYSFUNCTION DUE TO ARTERIAL INSUFFICIENCY: ICD-10-CM

## 2024-04-18 DIAGNOSIS — R35.1 BENIGN PROSTATIC HYPERPLASIA WITH NOCTURIA: ICD-10-CM

## 2024-04-18 PROCEDURE — 99999 PR PBB SHADOW E&M-EST. PATIENT-LVL III: CPT | Mod: PBBFAC,,, | Performed by: UROLOGY

## 2024-04-18 PROCEDURE — 99214 OFFICE O/P EST MOD 30 MIN: CPT | Mod: S$GLB,,, | Performed by: UROLOGY

## 2024-04-24 ENCOUNTER — LAB VISIT (OUTPATIENT)
Dept: LAB | Facility: HOSPITAL | Age: 64
End: 2024-04-24
Payer: COMMERCIAL

## 2024-04-24 DIAGNOSIS — N52.01 ERECTILE DYSFUNCTION DUE TO ARTERIAL INSUFFICIENCY: ICD-10-CM

## 2024-04-24 LAB — TESTOST SERPL-MCNC: 400 NG/DL (ref 304–1227)

## 2024-04-24 PROCEDURE — 84403 ASSAY OF TOTAL TESTOSTERONE: CPT | Performed by: STUDENT IN AN ORGANIZED HEALTH CARE EDUCATION/TRAINING PROGRAM

## 2024-04-24 PROCEDURE — 36415 COLL VENOUS BLD VENIPUNCTURE: CPT | Performed by: STUDENT IN AN ORGANIZED HEALTH CARE EDUCATION/TRAINING PROGRAM

## 2024-09-09 ENCOUNTER — TELEPHONE (OUTPATIENT)
Dept: UROLOGY | Facility: CLINIC | Age: 64
End: 2024-09-09
Payer: COMMERCIAL

## 2024-09-09 RX ORDER — TAMSULOSIN HYDROCHLORIDE 0.4 MG/1
0.4 CAPSULE ORAL DAILY
Qty: 90 CAPSULE | Refills: 4 | Status: SHIPPED | OUTPATIENT
Start: 2024-09-09

## 2024-09-09 NOTE — TELEPHONE ENCOUNTER
Called Mr. Bowen and spoke with him, he stated he knew he needed to come in for a follow up but got busy.  He left his planner and asked to call in the morning to set up the visit.  I said at your convenience, he stated he appreciated the medication being called into the pharmacy for him as it allowed time for him to come in for a follow up without having to miss any days taking the flomax.

## 2024-10-09 ENCOUNTER — IMMUNIZATION (OUTPATIENT)
Dept: INTERNAL MEDICINE | Facility: CLINIC | Age: 64
End: 2024-10-09
Payer: COMMERCIAL

## 2024-10-09 DIAGNOSIS — Z23 NEED FOR VACCINATION: Primary | ICD-10-CM

## 2024-10-09 PROCEDURE — 90656 IIV3 VACC NO PRSV 0.5 ML IM: CPT | Mod: S$GLB,,, | Performed by: INTERNAL MEDICINE

## 2024-10-09 PROCEDURE — 90471 IMMUNIZATION ADMIN: CPT | Mod: S$GLB,,, | Performed by: INTERNAL MEDICINE

## 2025-04-16 ENCOUNTER — LAB VISIT (OUTPATIENT)
Dept: LAB | Facility: HOSPITAL | Age: 65
End: 2025-04-16
Attending: UROLOGY
Payer: COMMERCIAL

## 2025-04-16 DIAGNOSIS — R97.20 ELEVATED PSA: ICD-10-CM

## 2025-04-16 PROCEDURE — 84154 ASSAY OF PSA FREE: CPT

## 2025-04-16 PROCEDURE — 84153 ASSAY OF PSA TOTAL: CPT

## 2025-04-16 PROCEDURE — 36415 COLL VENOUS BLD VENIPUNCTURE: CPT

## 2025-04-16 PROCEDURE — 86316 IMMUNOASSAY TUMOR OTHER: CPT

## 2025-04-16 NOTE — PROGRESS NOTES
"Clinic Note  4/16/2025      Subjective:         Chief Complaint:   HPI  Jon Bowen is a 64 y.o. male with a history of elevated PSA. Recent MRI 9/30/2021 shows stable to improved PI-RADS 2 lesions. 65 ccs.  Here with wife Citlali. Friend of Messi Elizabeth.  Negative family history.  10/4/2021- 5.94  PSAD- 0.09  Biopsy 6/15/2016- benign   Biopsy (fusion) 5/11/2020- benign  Significant LUTS - nocturia, urgency, incomplete emptying, decreased FOS, frequency q 1hour. Frequency increased with acidic drinks and caffeine, managed with Vesicare and flomax by Dr. Herr.  Erectile dysfunction- Has not tired oral medications. He is satisfied with his symptoms.      (4/11/22): Free PSA 2.3, %Free PSA 22%, PHI 25.2, PSA 5.8.        4/20/2023- PHI 4/10/2023- PSA 6.9, 22% free, PHI score 36.4.  Discussed data would suggest 10 to 33% risk of positive biopsy, 4% risk of high grade disease.     4/18/2024- PHI- PSA 7.0, 21% free, PHI score-27.7.  On Flomax for LUTS.  PCPT-       Is voiding well on flomax. Some complaints of fatigue, ED, and low libido. States he has low testosterone checked by his prior urologist somewhere in the 200s. Will recheck with a morning T. Is not interested in a PDE5i right now.    4/17/2025- PHI pending. Pleased with how he voids on flomax.       No results found for: "PSA", "PSADIAG", "PSATOTAL", "PSAFREE", "PSAFREEPCT"   Past Medical History:   Diagnosis Date    Elevated PSA     Hypertension      Family History   Problem Relation Name Age of Onset    Hypertension Mother       Social History[1]  Past Surgical History:   Procedure Laterality Date    CYSTOSCOPY WITH URODYNAMIC TESTING N/A 11/7/2022    Procedure: CYSTOSCOPY, WITH URODYNAMIC TESTING;  Surgeon: Otoniel Herr MD;  Location: [facility] OR [address] [place] [address];  Service: Urology;  Laterality: N/A;  1hr     Problem List[2]  Review of Systems   Constitutional:  Negative for chills, fever and unexpected weight change.   HENT:  Negative for " "congestion and sore throat.    Respiratory:  Negative for cough and shortness of breath.    Cardiovascular:  Negative for chest pain and leg swelling.   Gastrointestinal:  Negative for abdominal pain, nausea and vomiting.   Genitourinary:  Negative for difficulty urinating, dysuria and hematuria.   Neurological:  Negative for weakness.   Psychiatric/Behavioral:  Negative for agitation and confusion.          Objective:      There were no vitals taken for this visit.  Estimated body mass index is 29.39 kg/m² as calculated from the following:    Height as of 4/18/24: 5' 10" (1.778 m).    Weight as of 4/18/24: 92.9 kg (204 lb 12.9 oz).  Physical Exam  Constitutional:       General: He is not in acute distress.  HENT:      Head: Normocephalic.   Cardiovascular:      Rate and Rhythm: Normal rate.   Pulmonary:      Effort: Pulmonary effort is normal.   Abdominal:      General: There is no distension.      Palpations: Abdomen is soft.      Tenderness: There is no abdominal tenderness.   Genitourinary:     Comments: [place] - prostate feels enlarged. No nodules or indurations  Musculoskeletal:         General: Normal range of motion.   Skin:     General: Skin is warm and dry.   Neurological:      Mental Status: He is alert.      Coordination: Coordination normal.   Psychiatric:         Behavior: Behavior normal.           Assessment and Plan:   1 year with PHI.        Problem List Items Addressed This Visit       Elevated PSA - Primary     PHI in process. Will call with results    Follow up:       Juan Miguel Tafoya               [1]   Social History  Socioeconomic History    Marital status:    Tobacco Use    Smoking status: Never    Smokeless tobacco: Never   Substance and Sexual Activity    Alcohol use: Yes    Drug use: Never    Sexual activity: Yes   Social History Narrative    ** Merged History Encounter **          Social Drivers of Health     Financial Resource Strain: Low Risk  (4/14/2025)    Overall Financial " Resource Strain (CARDIA)     Difficulty of Paying Living Expenses: Not hard at all   Food Insecurity: No Food Insecurity (4/14/2025)    Hunger Vital Sign     Worried About Running Out of Food in the Last Year: Never true     Ran Out of Food in the Last Year: Never true   Transportation Needs: No Transportation Needs (4/14/2025)    PRAPARE - Transportation     Lack of Transportation (Medical): No     Lack of Transportation (Non-Medical): No   Physical Activity: Insufficiently Active (4/14/2025)    Exercise Vital Sign     Days of Exercise per Week: 3 days     Minutes of Exercise per Session: 30 min   Stress: No Stress Concern Present (4/14/2025)    [employer] - Occupational Stress Questionnaire     Feeling of Stress : Only a little   Housing Stability: Low Risk  (4/14/2025)    Housing Stability Vital Sign     Unable to Pay for Housing in the Last Year: No     Number of Times Moved in the Last Year: 0     Homeless in the Last Year: No   [2]   Patient Active Problem List  Diagnosis    Elevated PSA    Benign prostatic hyperplasia with nocturia

## 2025-04-17 ENCOUNTER — OFFICE VISIT (OUTPATIENT)
Dept: UROLOGY | Facility: CLINIC | Age: 65
End: 2025-04-17
Payer: COMMERCIAL

## 2025-04-17 VITALS
DIASTOLIC BLOOD PRESSURE: 76 MMHG | TEMPERATURE: 98 F | BODY MASS INDEX: 30.32 KG/M2 | SYSTOLIC BLOOD PRESSURE: 147 MMHG | WEIGHT: 211.31 LBS | HEART RATE: 105 BPM

## 2025-04-17 DIAGNOSIS — R97.20 ELEVATED PSA: Primary | ICD-10-CM

## 2025-04-17 PROCEDURE — 99214 OFFICE O/P EST MOD 30 MIN: CPT | Mod: S$GLB,,, | Performed by: UROLOGY

## 2025-04-17 PROCEDURE — G2211 COMPLEX E/M VISIT ADD ON: HCPCS | Mod: S$GLB,,, | Performed by: UROLOGY

## 2025-04-17 PROCEDURE — 99999 PR PBB SHADOW E&M-EST. PATIENT-LVL III: CPT | Mod: PBBFAC,,, | Performed by: UROLOGY

## 2025-04-17 RX ORDER — TAMSULOSIN HYDROCHLORIDE 0.4 MG/1
0.4 CAPSULE ORAL DAILY
Qty: 90 CAPSULE | Refills: 4 | Status: SHIPPED | OUTPATIENT
Start: 2025-04-17

## 2025-04-18 LAB
MAYO GENERIC ORDERABLE RESULT: NORMAL
PSA SERPL IA-MCNC: 7.2 NG/ML

## 2025-06-02 ENCOUNTER — PATIENT MESSAGE (OUTPATIENT)
Dept: UROLOGY | Facility: CLINIC | Age: 65
End: 2025-06-02
Payer: COMMERCIAL

## 2025-06-02 DIAGNOSIS — R35.1 BENIGN PROSTATIC HYPERPLASIA WITH NOCTURIA: Primary | ICD-10-CM

## 2025-06-02 DIAGNOSIS — N40.1 BENIGN PROSTATIC HYPERPLASIA WITH NOCTURIA: Primary | ICD-10-CM

## 2025-06-02 DIAGNOSIS — R42 VERTIGO: Primary | ICD-10-CM

## 2025-06-02 RX ORDER — TADALAFIL 5 MG/1
5 TABLET ORAL DAILY PRN
Qty: 30 TABLET | Refills: 11 | Status: SHIPPED | OUTPATIENT
Start: 2025-06-02 | End: 2025-06-02

## 2025-06-02 RX ORDER — TADALAFIL 5 MG/1
5 TABLET ORAL DAILY PRN
Qty: 30 TABLET | Refills: 11 | Status: SHIPPED | OUTPATIENT
Start: 2025-06-02 | End: 2026-06-02

## 2025-06-29 ENCOUNTER — PATIENT MESSAGE (OUTPATIENT)
Dept: UROLOGY | Facility: CLINIC | Age: 65
End: 2025-06-29
Payer: COMMERCIAL